# Patient Record
Sex: FEMALE | NOT HISPANIC OR LATINO | URBAN - METROPOLITAN AREA
[De-identification: names, ages, dates, MRNs, and addresses within clinical notes are randomized per-mention and may not be internally consistent; named-entity substitution may affect disease eponyms.]

---

## 2022-06-08 ENCOUNTER — APPOINTMENT (RX ONLY)
Dept: URBAN - METROPOLITAN AREA CLINIC 42 | Facility: CLINIC | Age: 42
Setting detail: DERMATOLOGY
End: 2022-06-08

## 2022-06-08 DIAGNOSIS — L259 CONTACT DERMATITIS AND OTHER ECZEMA, UNSPECIFIED CAUSE: ICD-10-CM | Status: INADEQUATELY CONTROLLED

## 2022-06-08 PROBLEM — L23.9 ALLERGIC CONTACT DERMATITIS, UNSPECIFIED CAUSE: Status: ACTIVE | Noted: 2022-06-08

## 2022-06-08 PROCEDURE — ? PRESCRIPTION

## 2022-06-08 PROCEDURE — 99204 OFFICE O/P NEW MOD 45 MIN: CPT

## 2022-06-08 PROCEDURE — ? COUNSELING

## 2022-06-08 RX ORDER — CLOBETASOL PROPIONATE 0.5 MG/G
CREAM TOPICAL BID
Qty: 120 | Refills: 0 | Status: ERX | COMMUNITY
Start: 2022-06-08

## 2022-06-08 RX ADMIN — CLOBETASOL PROPIONATE: 0.5 CREAM TOPICAL at 00:00

## 2022-06-24 ENCOUNTER — APPOINTMENT (RX ONLY)
Dept: URBAN - METROPOLITAN AREA CLINIC 42 | Facility: CLINIC | Age: 42
Setting detail: DERMATOLOGY
End: 2022-06-24

## 2022-06-24 DIAGNOSIS — L259 CONTACT DERMATITIS AND OTHER ECZEMA, UNSPECIFIED CAUSE: ICD-10-CM

## 2022-06-24 PROBLEM — L23.9 ALLERGIC CONTACT DERMATITIS, UNSPECIFIED CAUSE: Status: ACTIVE | Noted: 2022-06-24

## 2022-06-24 PROCEDURE — ? OTHER

## 2022-06-24 PROCEDURE — 99214 OFFICE O/P EST MOD 30 MIN: CPT

## 2022-06-24 PROCEDURE — ? PRESCRIPTION MEDICATION MANAGEMENT

## 2022-06-24 PROCEDURE — ? COUNSELING

## 2022-06-24 NOTE — PROCEDURE: PRESCRIPTION MEDICATION MANAGEMENT
Detail Level: Detailed
Continue Regimen: Advised patient to continue use of clobetasol
Render In Strict Bullet Format?: No

## 2022-06-24 NOTE — PROCEDURE: OTHER
Note Text (......Xxx Chief Complaint.): This diagnosis correlates with the
Other (Free Text): Lesions Injected: 1\\nMedication Injected: 10.0 mg/cc of Kenalog\\nTotal Volume Injected: 1.5cc\\[facility] #: [id number]\\[id number] by: Job Alter risks of atrophy were reviewed with the patient
Render Risk Assessment In Note?: no
Detail Level: Detailed

## 2022-06-24 NOTE — PROCEDURE: COUNSELING
Patient Specific Counseling (Will Not Stick From Patient To Patient): Patient to resume clobetasol in 10 days on raised areas only
Detail Level: Zone

## 2022-07-25 ENCOUNTER — APPOINTMENT (RX ONLY)
Dept: URBAN - METROPOLITAN AREA CLINIC 42 | Facility: CLINIC | Age: 42
Setting detail: DERMATOLOGY
End: 2022-07-25

## 2022-07-25 DIAGNOSIS — L259 CONTACT DERMATITIS AND OTHER ECZEMA, UNSPECIFIED CAUSE: ICD-10-CM

## 2022-07-25 PROBLEM — L23.9 ALLERGIC CONTACT DERMATITIS, UNSPECIFIED CAUSE: Status: ACTIVE | Noted: 2022-07-25

## 2022-07-25 PROCEDURE — ? OTHER

## 2022-07-25 PROCEDURE — ? PRESCRIPTION MEDICATION MANAGEMENT

## 2022-07-25 PROCEDURE — 99214 OFFICE O/P EST MOD 30 MIN: CPT

## 2022-07-25 PROCEDURE — ? COUNSELING

## 2022-07-25 NOTE — PROCEDURE: OTHER
Note Text (......Xxx Chief Complaint.): This diagnosis correlates with the
Other (Free Text): Lesions Injected: 4\\nMedication Injected: 10.0 mg/cc of Kenalog\\nTotal Volume Injected: 0.6cc\\[facility] #: [id number] by: Dana Amaro risks of atrophy were reviewed with the patient
Render Risk Assessment In Note?: no
Detail Level: Detailed

## 2022-09-02 ENCOUNTER — APPOINTMENT (RX ONLY)
Dept: URBAN - METROPOLITAN AREA CLINIC 42 | Facility: CLINIC | Age: 42
Setting detail: DERMATOLOGY
End: 2022-09-02

## 2022-09-02 DIAGNOSIS — L259 CONTACT DERMATITIS AND OTHER ECZEMA, UNSPECIFIED CAUSE: ICD-10-CM | Status: INADEQUATELY CONTROLLED

## 2022-09-02 PROBLEM — L23.9 ALLERGIC CONTACT DERMATITIS, UNSPECIFIED CAUSE: Status: ACTIVE | Noted: 2022-09-02

## 2022-09-02 PROCEDURE — ? COUNSELING

## 2022-09-02 PROCEDURE — 11900 INJECT SKIN LESIONS </W 7: CPT

## 2022-09-02 PROCEDURE — ? INTRALESIONAL KENALOG

## 2022-09-02 ASSESSMENT — LOCATION ZONE DERM: LOCATION ZONE: ARM

## 2022-09-02 ASSESSMENT — LOCATION DETAILED DESCRIPTION DERM: LOCATION DETAILED: RIGHT VENTRAL PROXIMAL FOREARM

## 2022-09-02 ASSESSMENT — LOCATION SIMPLE DESCRIPTION DERM: LOCATION SIMPLE: RIGHT FOREARM

## 2022-09-02 NOTE — PROCEDURE: INTRALESIONAL KENALOG
How Many Mls Were Removed From The 40 Mg/Ml (1ml) Vial When Preparing The Injectable Solution?: 0
Medical Necessity Clause: This procedure was medically necessary because of hair loss
Total Volume Injected (Ccs- Only Use Numbers And Decimals): 1.0
Administered By (Optional): Kash Gorman
Include Z78.9 (Other Specified Conditions Influencing Health Status) As An Associated Diagnosis?: No
Kenalog Preparation: Kenalog
Consent: The risks of atrophy were reviewed with the patient.
Validate Note Data When Using Inventory: Yes
Treatment Number (Optional): 2
Ndc# For Kenalog Only: 7641-7431-45
Concentration Of Solution Injected (Mg/Ml): 5.0
Detail Level: Simple
How Many Mls Were Removed From The 10 Mg/Ml (5ml) Vial When Preparing The Injectable Solution?: 0.5
Which Kenalog Vial Was Used?: Kenalog 10 mg/ml (5 ml vial)